# Patient Record
Sex: FEMALE | Race: BLACK OR AFRICAN AMERICAN | NOT HISPANIC OR LATINO | Employment: FULL TIME | URBAN - METROPOLITAN AREA
[De-identification: names, ages, dates, MRNs, and addresses within clinical notes are randomized per-mention and may not be internally consistent; named-entity substitution may affect disease eponyms.]

---

## 2019-12-22 ENCOUNTER — HOSPITAL ENCOUNTER (EMERGENCY)
Facility: HOSPITAL | Age: 24
Discharge: HOME | End: 2019-12-22
Attending: EMERGENCY MEDICINE
Payer: COMMERCIAL

## 2019-12-22 VITALS
RESPIRATION RATE: 20 BRPM | DIASTOLIC BLOOD PRESSURE: 57 MMHG | TEMPERATURE: 99 F | HEART RATE: 69 BPM | OXYGEN SATURATION: 100 % | SYSTOLIC BLOOD PRESSURE: 128 MMHG | BODY MASS INDEX: 22.16 KG/M2 | WEIGHT: 133 LBS | HEIGHT: 65 IN

## 2019-12-22 DIAGNOSIS — N30.01 ACUTE CYSTITIS WITH HEMATURIA: Primary | ICD-10-CM

## 2019-12-22 LAB
B-HCG UR QL: NEGATIVE
BILIRUBIN, POC UA: NEGATIVE
BLOOD, POC UA: ABNORMAL
CLARITY, POC UA: ABNORMAL
COLOR, POC UA: ABNORMAL
CTP QC/QA: YES
GLUCOSE, POC UA: NEGATIVE
KETONES, POC UA: NEGATIVE
LEUKOCYTE EST, POC UA: ABNORMAL
NITRITE, POC UA: POSITIVE
PH UR STRIP: 6 [PH]
PROTEIN, POC UA: ABNORMAL
SPECIFIC GRAVITY, POC UA: >=1.03
UROBILINOGEN, POC UA: 0.2 E.U./DL

## 2019-12-22 PROCEDURE — 81025 URINE PREGNANCY TEST: CPT | Mod: ER | Performed by: EMERGENCY MEDICINE

## 2019-12-22 PROCEDURE — 87077 CULTURE AEROBIC IDENTIFY: CPT

## 2019-12-22 PROCEDURE — 87088 URINE BACTERIA CULTURE: CPT

## 2019-12-22 PROCEDURE — 99284 EMERGENCY DEPT VISIT MOD MDM: CPT | Mod: 25,ER

## 2019-12-22 PROCEDURE — 63600175 PHARM REV CODE 636 W HCPCS: Mod: ER | Performed by: EMERGENCY MEDICINE

## 2019-12-22 PROCEDURE — 96372 THER/PROPH/DIAG INJ SC/IM: CPT | Mod: ER

## 2019-12-22 PROCEDURE — 87186 SC STD MICRODIL/AGAR DIL: CPT

## 2019-12-22 PROCEDURE — 87086 URINE CULTURE/COLONY COUNT: CPT

## 2019-12-22 PROCEDURE — 81003 URINALYSIS AUTO W/O SCOPE: CPT | Mod: ER

## 2019-12-22 RX ORDER — CEFTRIAXONE 1 G/1
1 INJECTION, POWDER, FOR SOLUTION INTRAMUSCULAR; INTRAVENOUS
Status: COMPLETED | OUTPATIENT
Start: 2019-12-22 | End: 2019-12-22

## 2019-12-22 RX ORDER — NITROFURANTOIN 25; 75 MG/1; MG/1
100 CAPSULE ORAL 2 TIMES DAILY
Qty: 10 CAPSULE | Refills: 0 | Status: SHIPPED | OUTPATIENT
Start: 2019-12-22 | End: 2019-12-27

## 2019-12-22 RX ORDER — PHENAZOPYRIDINE HYDROCHLORIDE 200 MG/1
200 TABLET, FILM COATED ORAL 3 TIMES DAILY
Qty: 6 TABLET | Refills: 0 | Status: SHIPPED | OUTPATIENT
Start: 2019-12-22 | End: 2020-01-01

## 2019-12-22 RX ADMIN — CEFTRIAXONE SODIUM 1 G: 1 INJECTION, POWDER, FOR SOLUTION INTRAMUSCULAR; INTRAVENOUS at 10:12

## 2019-12-23 NOTE — ED PROVIDER NOTES
Encounter Date: 12/22/2019    SCRIBE #1 NOTE: I, Anjana Sinclair, am scribing for, and in the presence of,  Dr. Downing . I have scribed the following portions of the note - Other sections scribed: HPI, ROS, PE .       History     Chief Complaint   Patient presents with    Hematuria     PT REPORTS HAVING BLOODY URINE FORE 2-3 DAYS, REPORTS ISN'T POSITIVE THAT ITS COMING FROM URINE.     This is a 24 y.o female with MS presents to the ED complaining of acute constant hematuria for 3 days. She reports her urine looks completely red today. She states the first day she noticed the blood, there was clots and was intermittent. She had thought she was having vaginal bleeding, but had used a tampon, and did not see any blood from vagina. She denies any pain currently. She also denies fever, vaginal discharge, dysuria, back pain, or abdominal pain. She endorses increased urinary frequency x 2 weeks.     The history is provided by the patient. No  was used.     Review of patient's allergies indicates:  No Known Allergies  Past Medical History:   Diagnosis Date    Multiple sclerosis      Past Surgical History:   Procedure Laterality Date    KNEE SURGERY       No family history on file.  Social History     Tobacco Use    Smoking status: Never Smoker    Smokeless tobacco: Never Used   Substance Use Topics    Alcohol use: Yes     Comment: SPECIAL OCCASIONA    Drug use: Never     Review of Systems   Constitutional: Negative for fever.   Gastrointestinal: Negative for abdominal pain, nausea and vomiting.   Genitourinary: Positive for frequency and hematuria. Negative for decreased urine volume, difficulty urinating, dysuria, flank pain, genital sores, menstrual problem, pelvic pain, vaginal bleeding and vaginal discharge.   Musculoskeletal: Negative for back pain.   All other systems reviewed and are negative.      Physical Exam     Initial Vitals [12/22/19 2035]   BP Pulse Resp Temp SpO2   118/80 75 20 98.9  °F (37.2 °C) 100 %      MAP       --         Physical Exam    Nursing note and vitals reviewed.  Constitutional: She appears well-developed and well-nourished.   HENT:   Head: Normocephalic and atraumatic.   Eyes: Conjunctivae are normal.   Neck: Normal range of motion and phonation normal. Neck supple.   Cardiovascular: Normal rate, regular rhythm, normal heart sounds and intact distal pulses. Exam reveals no gallop and no friction rub.    No murmur heard.  Pulmonary/Chest: Effort normal and breath sounds normal. No stridor. No respiratory distress. She has no wheezes. She has no rhonchi. She has no rales.   Abdominal: Soft. There is no tenderness. There is no rigidity, no rebound, no guarding and no CVA tenderness.   Musculoskeletal: Normal range of motion.   Neurological: She is alert and oriented to person, place, and time.   Skin: Skin is warm and dry. Capillary refill takes less than 2 seconds. No rash noted.   Psychiatric: She has a normal mood and affect. Her behavior is normal.         ED Course   Procedures  Labs Reviewed   POCT URINALYSIS W/O SCOPE - Abnormal; Notable for the following components:       Result Value    Spec Grav UA >=1.030 (*)     Blood, UA 3+ (*)     Protein, UA 2+ (*)     Nitrite, UA Positive (*)     Leukocytes, UA Trace (*)     All other components within normal limits   CULTURE, URINE   POCT URINE PREGNANCY   POCT URINALYSIS W/O SCOPE          Imaging Results          CT Renal Stone Study ABD Pelvis WO (Final result)  Result time 12/22/19 22:08:00    Final result by Ingrid Ware MD (12/22/19 22:08:00)                 Impression:      No acute intra-abdominal abnormalities identified.  No evidence of stones or obstructive uropathy.      Electronically signed by: Ingrid Ware MD  Date:    12/22/2019  Time:    22:08             Narrative:    EXAMINATION:  CT RENAL STONE STUDY ABD PELVIS WO    CLINICAL HISTORY:  Hematuria;    TECHNIQUE:  Low dose axial images, sagittal and coronal  reformations were obtained from the lung bases to the pubic symphysis.  Contrast was not administered.    COMPARISON:  None    FINDINGS:  The visualized portion of the heart is unremarkable.  The lung bases are clear.    No significant hepatic abnormality seen on this noncontrast exam.  There is no intra-or extrahepatic biliary ductal dilatation.  The gallbladder is unremarkable.  The stomach, pancreas, spleen, and adrenal glands are unremarkable.    Kidneys show no evidence of stones or hydronephrosis. Ureters are difficult to track, however no stones are seen along their expected courses.  Urinary bladder is nondistended.  Uterus is unremarkable.  No significant adnexal abnormalities are seen.    Appendix is visualized and is unremarkable.  The visualized loops of small and large bowel show no evidence of obstruction or inflammation.  Moderate volume retained stool is seen in the colon.  No free air or free fluid.    Aorta tapers normally.    No acute osseous abnormality identified. Subcutaneous soft tissue structures are unremarkable.                                 Medical Decision Making:   Clinical Tests:   Lab Tests: Ordered and Reviewed  Radiological Study: Ordered and Reviewed        Labs Reviewed  Admission on 12/22/2019, Discharged on 12/22/2019   Component Date Value Ref Range Status    POC Preg Test, Ur 12/22/2019 Negative  Negative Final     Acceptable 12/22/2019 Yes   Final    Glucose, UA 12/22/2019 Negative   Final    Bilirubin, UA 12/22/2019 Negative   Final    Ketones, UA 12/22/2019 Negative   Final    Spec Grav UA 12/22/2019 >=1.030*  Final    Blood, UA 12/22/2019 3+*  Final    PH, UA 12/22/2019 6.0   Final    Protein, UA 12/22/2019 2+*  Final    Urobilinogen, UA 12/22/2019 0.2  E.U./dL Final    Nitrite, UA 12/22/2019 Positive*  Final    Leukocytes, UA 12/22/2019 Trace*  Final    Color, UA 12/22/2019 Dark yellow   Final    Clarity, UA 12/22/2019 Cloudy   Final         Imaging Reviewed    Imaging Results          CT Renal Stone Study ABD Pelvis WO (Final result)  Result time 12/22/19 22:08:00    Final result by Ingrid Ware MD (12/22/19 22:08:00)                 Impression:      No acute intra-abdominal abnormalities identified.  No evidence of stones or obstructive uropathy.      Electronically signed by: Ingrid Ware MD  Date:    12/22/2019  Time:    22:08             Narrative:    EXAMINATION:  CT RENAL STONE STUDY ABD PELVIS WO    CLINICAL HISTORY:  Hematuria;    TECHNIQUE:  Low dose axial images, sagittal and coronal reformations were obtained from the lung bases to the pubic symphysis.  Contrast was not administered.    COMPARISON:  None    FINDINGS:  The visualized portion of the heart is unremarkable.  The lung bases are clear.    No significant hepatic abnormality seen on this noncontrast exam.  There is no intra-or extrahepatic biliary ductal dilatation.  The gallbladder is unremarkable.  The stomach, pancreas, spleen, and adrenal glands are unremarkable.    Kidneys show no evidence of stones or hydronephrosis. Ureters are difficult to track, however no stones are seen along their expected courses.  Urinary bladder is nondistended.  Uterus is unremarkable.  No significant adnexal abnormalities are seen.    Appendix is visualized and is unremarkable.  The visualized loops of small and large bowel show no evidence of obstruction or inflammation.  Moderate volume retained stool is seen in the colon.  No free air or free fluid.    Aorta tapers normally.    No acute osseous abnormality identified. Subcutaneous soft tissue structures are unremarkable.                                Medications given in ED    Medications   cefTRIAXone injection 1 g (1 g Intramuscular Given 12/22/19 9368)       This document was produced by a scribe under my direction and in my presence. I agree with the content of the note and have made any necessary edits.     Joby Downing MD          Note was created using voice recognition software. Note may have occasional typographical errors that may not have been identified and edited despite good parminder initial review prior to signing.         Scribe Attestation:   Scribe #1: I performed the above scribed service and the documentation accurately describes the services I performed. I attest to the accuracy of the note.               Discharge Medications     Discharge Medication List as of 12/22/2019 10:14 PM      START taking these medications    Details   nitrofurantoin, macrocrystal-monohydrate, (MACROBID) 100 MG capsule Take 1 capsule (100 mg total) by mouth 2 (two) times daily. for 5 days, Starting Sun 12/22/2019, Until Fri 12/27/2019, Print      phenazopyridine (PYRIDIUM) 200 MG tablet Take 1 tablet (200 mg total) by mouth 3 (three) times daily. for 10 days, Starting Sun 12/22/2019, Until Wed 1/1/2020, Print                   Patient discharged to home in stable condition with instructions to:   1. Please take all meds as prescribed.  2. Follow-up with your primary care doctor   3. Return precautions discussed and patient and/or family/caretaker understands to return to the emergency room for any concerns including worsening of your current symptoms, fever, chills, night sweats, worsening pain, chest pain, shortness of breath, nausea, vomiting, diarrhea, bleeding, headache, difficulty talking, visual disturbances, weakness, numbness or any other acute concerns         Clinical Impression:     1. Acute cystitis with hematuria            Disposition:   Disposition: Discharged  Condition: Stable                     Joby Downing MD  12/23/19 0113

## 2019-12-24 LAB — BACTERIA UR CULT: ABNORMAL

## 2020-07-30 ENCOUNTER — HOSPITAL ENCOUNTER (EMERGENCY)
Facility: HOSPITAL | Age: 25
Discharge: HOME | End: 2020-07-30
Attending: EMERGENCY MEDICINE
Payer: COMMERCIAL

## 2020-07-30 VITALS
WEIGHT: 136 LBS | OXYGEN SATURATION: 97 % | BODY MASS INDEX: 22.66 KG/M2 | TEMPERATURE: 99 F | HEIGHT: 65 IN | SYSTOLIC BLOOD PRESSURE: 116 MMHG | HEART RATE: 71 BPM | RESPIRATION RATE: 18 BRPM | DIASTOLIC BLOOD PRESSURE: 61 MMHG

## 2020-07-30 DIAGNOSIS — Z20.822 SUSPECTED COVID-19 VIRUS INFECTION: ICD-10-CM

## 2020-07-30 DIAGNOSIS — J02.9 SORE THROAT: Primary | ICD-10-CM

## 2020-07-30 LAB
B-HCG UR QL: NEGATIVE
CTP QC/QA: YES
CTP QC/QA: YES
S PYO RRNA THROAT QL PROBE: NEGATIVE

## 2020-07-30 PROCEDURE — 99283 EMERGENCY DEPT VISIT LOW MDM: CPT | Mod: 25,ER

## 2020-07-30 PROCEDURE — U0003 INFECTIOUS AGENT DETECTION BY NUCLEIC ACID (DNA OR RNA); SEVERE ACUTE RESPIRATORY SYNDROME CORONAVIRUS 2 (SARS-COV-2) (CORONAVIRUS DISEASE [COVID-19]), AMPLIFIED PROBE TECHNIQUE, MAKING USE OF HIGH THROUGHPUT TECHNOLOGIES AS DESCRIBED BY CMS-2020-01-R: HCPCS

## 2020-07-30 PROCEDURE — 81025 URINE PREGNANCY TEST: CPT | Mod: ER | Performed by: EMERGENCY MEDICINE

## 2020-07-30 PROCEDURE — 87081 CULTURE SCREEN ONLY: CPT

## 2020-07-30 PROCEDURE — 87880 STREP A ASSAY W/OPTIC: CPT | Mod: ER

## 2020-07-30 RX ORDER — NAPROXEN 375 MG/1
375 TABLET ORAL 2 TIMES DAILY PRN
Qty: 20 TABLET | Refills: 0 | OUTPATIENT
Start: 2020-07-30 | End: 2020-10-15

## 2020-07-30 NOTE — ED PROVIDER NOTES
Encounter Date: 2020       History     Chief Complaint   Patient presents with    Sore Throat     pt presents to ED with c/o sore throat and cant taste her food. Denies being around anyone who's been sick     loss of taste     25-year-old female who denies past medical history presents with sore throat and loss of taste.  Symptoms started today.  No medications taken for symptoms prior to arrival.  She denies fever, cough, chills, myalgias, nausea, vomiting.  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        Review of patient's allergies indicates:  No Known Allergies  Past Medical History:   Diagnosis Date    Multiple sclerosis      Past Surgical History:   Procedure Laterality Date    KNEE SURGERY       No family history on file.  Social History     Tobacco Use    Smoking status: Never Smoker    Smokeless tobacco: Never Used   Substance Use Topics    Alcohol use: Yes     Comment: SPECIAL OCCASIONA    Drug use: Never     Review of Systems   Constitutional: Negative for chills and fever.   HENT: Positive for sore throat. Negative for congestion.    Eyes: Negative for visual disturbance.   Respiratory: Negative for cough and shortness of breath.    Cardiovascular: Negative for chest pain.   Gastrointestinal: Negative for abdominal pain, nausea and vomiting.   Genitourinary: Negative for dysuria and vaginal discharge.   Skin: Negative for rash.   Neurological: Negative for headaches.   Psychiatric/Behavioral: Negative for decreased concentration.       Physical Exam     Initial Vitals [20 1805]   BP Pulse Resp Temp SpO2   116/61 71 18 98.6 °F (37 °C) 97 %      MAP       --         Physical Exam    Nursing note and vitals reviewed.  Constitutional: She appears well-developed and well-nourished. She is not diaphoretic. No distress.   HENT:   Right Ear: Tympanic membrane normal.   Left Ear: Tympanic membrane normal.   Mouth/Throat:  Uvula is midline and oropharynx is clear and moist. No oral lesions. No oropharyngeal exudate or posterior oropharyngeal erythema.   Eyes: Pupils are equal, round, and reactive to light.   Neck: Neck supple.   Cardiovascular: Normal rate and regular rhythm.   Pulmonary/Chest: Breath sounds normal.   Abdominal: Soft. There is no abdominal tenderness.   Musculoskeletal: No edema.   Neurological: She is alert and oriented to person, place, and time.   Skin: Skin is warm and dry.   Psychiatric: She has a normal mood and affect.         ED Course   Procedures  Labs Reviewed   CULTURE, STREP A,  THROAT   SARS-COV-2 (COVID-19) QUALITATIVE PCR    Narrative:     Is this needed for pre-procedure or pre-op testing?->No   POCT URINE PREGNANCY   POCT RAPID STREP A          Imaging Results    None          Medical Decision Making:   Initial Assessment:   25-year-old female presenting with loss of taste, sore throat.  Symptoms started today.  Overall, this patient is very well-appearing.  I do not appreciate any or pharyngeal exudate or erythema.  Will test for strep throat, will send COVID-19 routine screening.  Reviewed with patient she will need to self quarantine at home until her test results are available.                             This dictation has been generated using M-Modal Fluency Direct dictation; some phonetic errors may occur.       Clinical Impression:       ICD-10-CM ICD-9-CM   1. Sore throat  J02.9 462   2. Suspected Covid-19 Virus Infection  R68.89              ED Disposition Condition    Discharge Stable        ED Prescriptions     Medication Sig Dispense Start Date End Date Auth. Provider    naproxen (NAPROSYN) 375 MG tablet Take 1 tablet (375 mg total) by mouth 2 (two) times daily as needed. 20 tablet 7/30/2020  Radha Toledo MD        Follow-up Information     Follow up With Specialties Details Why Contact Info    [facility]  Schedule an appointment as soon as possible for a visit in  2 weeks To establish primary care, To recheck your symptoms [address] [place] [address]  [phone]      [facility] Emergency Department Emergency Medicine  As needed, If symptoms worsen [address]  [facility] [address] [phone]                                     Radha Toledo MD  07/31/20 5833

## 2020-07-30 NOTE — Clinical Note
"Sa Lara "Sa Israel Thapa was seen and treated in our emergency department on 7/30/2020.     COVID-19 is present in our communities across the state. There is limited testing for COVID at this time, so not all patients can be tested. In this situation, your employee meets the following criteria:    Sa Marco Thapa has met the criteria for COVID-19 testing based upon symptoms, travel, and/or potential exposure. The test has been completed and is pending results at this time. During this time the employee is not able to work and should be quarantined per the Centers for Disease Control timelines.     If you have any questions or concerns, or if I can be of further assistance, please do not hesitate to contact me.    Sincerely,             Radha Toledo MD"
"Sa Lara "Sa Israel Thapa was seen and treated in our emergency department on 7/30/2020.     COVID-19 is present in our communities across the state. There is limited testing for COVID at this time, so not all patients can be tested. In this situation, your employee meets the following criteria:    Sa Marco Thapa has met the criteria for COVID-19 testing based upon symptoms, travel, and/or potential exposure. The test has been completed and is pending results at this time. During this time the employee is not able to work and should be quarantined per the Centers for Disease Control timelines.     If you have any questions or concerns, or if I can be of further assistance, please do not hesitate to contact me.    Sincerely,             Radha Toledo MD"
[facility]

## 2020-07-31 LAB — SARS-COV-2 RNA RESP QL NAA+PROBE: NOT DETECTED

## 2020-08-01 LAB — BACTERIA THROAT CULT: NORMAL

## 2020-09-08 ENCOUNTER — HOSPITAL ENCOUNTER (EMERGENCY)
Facility: HOSPITAL | Age: 25
Discharge: HOME | End: 2020-09-08
Attending: EMERGENCY MEDICINE
Payer: COMMERCIAL

## 2020-09-08 VITALS
HEIGHT: 65 IN | RESPIRATION RATE: 18 BRPM | WEIGHT: 137 LBS | DIASTOLIC BLOOD PRESSURE: 57 MMHG | BODY MASS INDEX: 22.82 KG/M2 | OXYGEN SATURATION: 98 % | HEART RATE: 62 BPM | TEMPERATURE: 99 F | SYSTOLIC BLOOD PRESSURE: 122 MMHG

## 2020-09-08 DIAGNOSIS — S33.5XXA LUMBAR SPRAIN, INITIAL ENCOUNTER: ICD-10-CM
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
## 2020-09-08 DIAGNOSIS — S13.9XXA CERVICAL SPRAIN, INITIAL ENCOUNTER: ICD-10-CM

## 2020-09-08 LAB
B-HCG UR QL: NEGATIVE
CTP QC/QA: YES

## 2020-09-08 PROCEDURE — 99284 EMERGENCY DEPT VISIT MOD MDM: CPT | Mod: 25,ER

## 2020-09-08 PROCEDURE — 81025 URINE PREGNANCY TEST: CPT | Mod: ER | Performed by: EMERGENCY MEDICINE

## 2020-09-08 PROCEDURE — 25000003 PHARM REV CODE 250: Mod: ER | Performed by: NURSE PRACTITIONER

## 2020-09-08 RX ORDER — IBUPROFEN 600 MG/1
600 TABLET ORAL ONCE
Status: COMPLETED | OUTPATIENT
Start: 2020-09-08 | End: 2020-09-08

## 2020-09-08 RX ORDER — METHOCARBAMOL 500 MG/1
1000 TABLET, FILM COATED ORAL EVERY 6 HOURS PRN
Qty: 40 TABLET | Refills: 0 | OUTPATIENT
Start: 2020-09-08 | End: 2020-10-15

## 2020-09-08 RX ORDER — TRIPROLIDINE/PSEUDOEPHEDRINE 2.5MG-60MG
600 TABLET ORAL
Status: DISCONTINUED | OUTPATIENT
Start: 2020-09-08 | End: 2020-09-08

## 2020-09-08 RX ORDER — DICLOFENAC SODIUM 50 MG/1
50 TABLET, DELAYED RELEASE ORAL 3 TIMES DAILY PRN
Qty: 24 TABLET | Refills: 0 | OUTPATIENT
Start: 2020-09-08 | End: 2020-10-15

## 2020-09-08 RX ADMIN — IBUPROFEN 600 MG: 600 TABLET ORAL at 07:09

## 2020-09-08 NOTE — Clinical Note
Sa Marco Thapa was seen and treated in our emergency department on 9/8/2020.  She may return to work on 09/11/2020.       If you have any questions or concerns, please don't hesitate to call.      Mc Barton RN
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
## 2020-09-08 NOTE — Clinical Note
Sa Marco Thapa was seen and treated in our emergency department on 9/8/2020.  She may return to work on 09/14/2020.       If you have any questions or concerns, please don't hesitate to call.      Mc Barton RN

## 2020-10-15 ENCOUNTER — HOSPITAL ENCOUNTER (EMERGENCY)
Facility: HOSPITAL | Age: 25
Discharge: HOME | End: 2020-10-15
Attending: INTERNAL MEDICINE
Payer: COMMERCIAL

## 2020-10-15 VITALS
TEMPERATURE: 98 F | HEIGHT: 65 IN | RESPIRATION RATE: 18 BRPM | OXYGEN SATURATION: 100 % | HEART RATE: 63 BPM | SYSTOLIC BLOOD PRESSURE: 110 MMHG | DIASTOLIC BLOOD PRESSURE: 63 MMHG | WEIGHT: 137 LBS | BODY MASS INDEX: 22.82 KG/M2

## 2020-10-15 DIAGNOSIS — Z04.1 EXAM FOLLOWING MVC (MOTOR VEHICLE COLLISION), NO APPARENT INJURY: Primary | ICD-10-CM

## 2020-10-15 LAB
B-HCG UR QL: NEGATIVE
CTP QC/QA: YES

## 2020-10-15 PROCEDURE — 99284 EMERGENCY DEPT VISIT MOD MDM: CPT | Mod: ER

## 2020-10-15 PROCEDURE — 81025 URINE PREGNANCY TEST: CPT | Mod: ER | Performed by: EMERGENCY MEDICINE

## 2020-10-15 RX ORDER — IBUPROFEN 600 MG/1
600 TABLET ORAL 3 TIMES DAILY
Qty: 30 TABLET | Refills: 0 | OUTPATIENT
Start: 2020-10-15 | End: 2021-12-28

## 2020-10-15 RX ORDER — METHOCARBAMOL 750 MG/1
1500 TABLET, FILM COATED ORAL 3 TIMES DAILY
Qty: 30 TABLET | Refills: 0 | Status: SHIPPED | OUTPATIENT
Start: 2020-10-15 | End: 2020-10-20

## 2020-10-15 NOTE — Clinical Note
"Sa Lara "Sa Lara" Elier was seen and treated in our emergency department on 10/15/2020.  She may return to work on 10/16/2020.       If you have any questions or concerns, please don't hesitate to call.      Leila Santana RN    "
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
## 2021-07-15 ENCOUNTER — HOSPITAL ENCOUNTER (EMERGENCY)
Facility: HOSPITAL | Age: 26
Discharge: HOME | End: 2021-07-15
Attending: EMERGENCY MEDICINE
Payer: MEDICAID

## 2021-07-15 VITALS
TEMPERATURE: 99 F | OXYGEN SATURATION: 99 % | BODY MASS INDEX: 22.99 KG/M2 | RESPIRATION RATE: 16 BRPM | HEART RATE: 79 BPM | SYSTOLIC BLOOD PRESSURE: 99 MMHG | DIASTOLIC BLOOD PRESSURE: 78 MMHG | WEIGHT: 138 LBS | HEIGHT: 65 IN

## 2021-07-15 DIAGNOSIS — R10.9 ABDOMINAL PAIN: ICD-10-CM

## 2021-07-15 DIAGNOSIS — K59.00 CONSTIPATION, UNSPECIFIED CONSTIPATION TYPE: Primary | ICD-10-CM

## 2021-07-15 DIAGNOSIS — R51.9 HEADACHE IN FRONT OF HEAD: ICD-10-CM

## 2021-07-15 LAB
ALBUMIN SERPL-MCNC: 3.6 G/DL (ref 3.3–5.5)
ALP SERPL-CCNC: 75 U/L (ref 42–141)
B-HCG UR QL: NEGATIVE
BILIRUB SERPL-MCNC: 0.7 MG/DL (ref 0.2–1.6)
BUN SERPL-MCNC: 12 MG/DL (ref 7–22)
CALCIUM SERPL-MCNC: 9.5 MG/DL (ref 8–10.3)
CHLORIDE SERPL-SCNC: 107 MMOL/L (ref 98–108)
CREAT SERPL-MCNC: 0.9 MG/DL (ref 0.6–1.2)
CTP QC/QA: YES
GLUCOSE SERPL-MCNC: 89 MG/DL (ref 73–118)
POC ALT (SGPT): 16 U/L (ref 10–47)
POC AST (SGOT): 27 U/L (ref 11–38)
POC TCO2: 27 MMOL/L (ref 18–33)
POTASSIUM BLD-SCNC: 3.9 MMOL/L (ref 3.6–5.1)
PROTEIN, POC: 7.2 G/DL (ref 6.4–8.1)
SODIUM BLD-SCNC: 139 MMOL/L (ref 128–145)

## 2021-07-15 PROCEDURE — 81025 URINE PREGNANCY TEST: CPT | Mod: ER | Performed by: EMERGENCY MEDICINE

## 2021-07-15 PROCEDURE — 96360 HYDRATION IV INFUSION INIT: CPT | Mod: ER

## 2021-07-15 PROCEDURE — 85025 COMPLETE CBC W/AUTO DIFF WBC: CPT | Mod: ER

## 2021-07-15 PROCEDURE — 80053 COMPREHEN METABOLIC PANEL: CPT | Mod: ER

## 2021-07-15 PROCEDURE — 25000003 PHARM REV CODE 250: Mod: ER | Performed by: EMERGENCY MEDICINE

## 2021-07-15 PROCEDURE — 99285 EMERGENCY DEPT VISIT HI MDM: CPT | Mod: 25,ER

## 2021-07-15 RX ORDER — DOCUSATE SODIUM 100 MG/1
100 CAPSULE, LIQUID FILLED ORAL 2 TIMES DAILY
Qty: 60 CAPSULE | Refills: 0 | Status: SHIPPED | OUTPATIENT
Start: 2021-07-15 | End: 2022-03-10

## 2021-07-15 RX ORDER — PROCHLORPERAZINE MALEATE 10 MG
10 TABLET ORAL EVERY 6 HOURS PRN
Qty: 30 TABLET | Refills: 0 | Status: SHIPPED | OUTPATIENT
Start: 2021-07-15

## 2021-07-15 RX ORDER — PREDNISONE 50 MG/1
50 TABLET ORAL DAILY
Qty: 5 TABLET | Refills: 0 | Status: SHIPPED | OUTPATIENT
Start: 2021-07-15 | End: 2021-07-20

## 2021-07-15 RX ORDER — BUTALBITAL, ACETAMINOPHEN AND CAFFEINE 50; 325; 40 MG/1; MG/1; MG/1
1 TABLET ORAL EVERY 4 HOURS PRN
Qty: 15 TABLET | Refills: 0 | Status: SHIPPED | OUTPATIENT
Start: 2021-07-15 | End: 2021-08-14

## 2021-07-15 RX ADMIN — SODIUM CHLORIDE 1000 ML: 0.9 INJECTION, SOLUTION INTRAVENOUS at 08:07

## 2021-12-28 ENCOUNTER — HOSPITAL ENCOUNTER (EMERGENCY)
Facility: HOSPITAL | Age: 26
Discharge: HOME | End: 2021-12-28
Attending: EMERGENCY MEDICINE
Payer: MEDICAID

## 2021-12-28 VITALS
HEART RATE: 58 BPM | RESPIRATION RATE: 18 BRPM | OXYGEN SATURATION: 100 % | SYSTOLIC BLOOD PRESSURE: 119 MMHG | TEMPERATURE: 99 F | HEIGHT: 65 IN | DIASTOLIC BLOOD PRESSURE: 83 MMHG | WEIGHT: 122 LBS | BODY MASS INDEX: 20.33 KG/M2

## 2021-12-28 DIAGNOSIS — S16.1XXA CERVICAL STRAIN, ACUTE, INITIAL ENCOUNTER: ICD-10-CM

## 2021-12-28 DIAGNOSIS — S00.83XA CONTUSION OF FACE, INITIAL ENCOUNTER: Primary | ICD-10-CM

## 2021-12-28 LAB
B-HCG UR QL: NEGATIVE
CTP QC/QA: YES

## 2021-12-28 PROCEDURE — 99284 EMERGENCY DEPT VISIT MOD MDM: CPT | Mod: 25,ER

## 2021-12-28 PROCEDURE — 25000003 PHARM REV CODE 250: Mod: ER | Performed by: NURSE PRACTITIONER

## 2021-12-28 PROCEDURE — 81025 URINE PREGNANCY TEST: CPT | Mod: ER | Performed by: EMERGENCY MEDICINE

## 2021-12-28 PROCEDURE — 96372 THER/PROPH/DIAG INJ SC/IM: CPT | Mod: ER

## 2021-12-28 PROCEDURE — 63600175 PHARM REV CODE 636 W HCPCS: Mod: ER | Performed by: EMERGENCY MEDICINE

## 2021-12-28 RX ORDER — METHOCARBAMOL 750 MG/1
1500 TABLET, FILM COATED ORAL EVERY 6 HOURS
Qty: 24 TABLET | Refills: 0 | Status: SHIPPED | OUTPATIENT
Start: 2021-12-28 | End: 2021-12-31

## 2021-12-28 RX ORDER — KETOROLAC TROMETHAMINE 30 MG/ML
30 INJECTION, SOLUTION INTRAMUSCULAR; INTRAVENOUS
Status: COMPLETED | OUTPATIENT
Start: 2021-12-28 | End: 2021-12-28

## 2021-12-28 RX ORDER — IBUPROFEN 800 MG/1
800 TABLET ORAL EVERY 6 HOURS PRN
Qty: 20 TABLET | Refills: 0 | Status: SHIPPED | OUTPATIENT
Start: 2021-12-28 | End: 2022-03-10

## 2021-12-28 RX ORDER — ACETAMINOPHEN 325 MG/1
650 TABLET ORAL
Status: COMPLETED | OUTPATIENT
Start: 2021-12-28 | End: 2021-12-28

## 2021-12-28 RX ADMIN — KETOROLAC TROMETHAMINE 30 MG: 30 INJECTION, SOLUTION INTRAMUSCULAR at 07:12

## 2021-12-28 RX ADMIN — ACETAMINOPHEN 650 MG: 325 TABLET ORAL at 06:12
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
## 2021-12-28 NOTE — Clinical Note
"Sa Lara "Sa Lara" Elier was seen and treated in our emergency department on 12/28/2021.  She may return to work on 12/30/2021.       If you have any questions or concerns, please don't hesitate to call.      Irish Fernandez RN    "
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
## 2022-03-10 ENCOUNTER — HOSPITAL ENCOUNTER (EMERGENCY)
Facility: HOSPITAL | Age: 27
Discharge: HOME | End: 2022-03-10
Attending: EMERGENCY MEDICINE
Payer: MEDICAID

## 2022-03-10 VITALS
BODY MASS INDEX: 23.49 KG/M2 | HEART RATE: 60 BPM | SYSTOLIC BLOOD PRESSURE: 112 MMHG | HEIGHT: 65 IN | TEMPERATURE: 99 F | WEIGHT: 141 LBS | RESPIRATION RATE: 18 BRPM | OXYGEN SATURATION: 100 % | DIASTOLIC BLOOD PRESSURE: 67 MMHG

## 2022-03-10 DIAGNOSIS — N92.0 MENORRHAGIA WITH REGULAR CYCLE: Primary | ICD-10-CM

## 2022-03-10 LAB
B-HCG UR QL: NEGATIVE
BILIRUBIN, POC UA: NEGATIVE
BLOOD, POC UA: ABNORMAL
CLARITY, POC UA: CLEAR
COLOR, POC UA: YELLOW
CTP QC/QA: YES
GLUCOSE, POC UA: NEGATIVE
KETONES, POC UA: NEGATIVE
LEUKOCYTE EST, POC UA: NEGATIVE
NITRITE, POC UA: NEGATIVE
PH UR STRIP: 7 [PH]
PROTEIN, POC UA: ABNORMAL
SPECIFIC GRAVITY, POC UA: 1.02
UROBILINOGEN, POC UA: 2 E.U./DL

## 2022-03-10 PROCEDURE — 99284 EMERGENCY DEPT VISIT MOD MDM: CPT | Mod: 25,ER

## 2022-03-10 PROCEDURE — 81025 URINE PREGNANCY TEST: CPT | Mod: ER | Performed by: EMERGENCY MEDICINE

## 2022-03-10 PROCEDURE — 87481 CANDIDA DNA AMP PROBE: CPT | Mod: 59 | Performed by: EMERGENCY MEDICINE

## 2022-03-10 PROCEDURE — 96372 THER/PROPH/DIAG INJ SC/IM: CPT | Performed by: EMERGENCY MEDICINE

## 2022-03-10 PROCEDURE — 87491 CHLMYD TRACH DNA AMP PROBE: CPT | Performed by: EMERGENCY MEDICINE

## 2022-03-10 PROCEDURE — 25000003 PHARM REV CODE 250: Mod: ER | Performed by: EMERGENCY MEDICINE

## 2022-03-10 PROCEDURE — 87801 DETECT AGNT MULT DNA AMPLI: CPT | Performed by: EMERGENCY MEDICINE

## 2022-03-10 PROCEDURE — 81003 URINALYSIS AUTO W/O SCOPE: CPT | Mod: ER

## 2022-03-10 PROCEDURE — 63600175 PHARM REV CODE 636 W HCPCS: Mod: ER | Performed by: EMERGENCY MEDICINE

## 2022-03-10 PROCEDURE — 87591 N.GONORRHOEAE DNA AMP PROB: CPT | Mod: 59 | Performed by: EMERGENCY MEDICINE

## 2022-03-10 RX ORDER — ONDANSETRON 4 MG/1
4 TABLET, ORALLY DISINTEGRATING ORAL ONCE
Status: COMPLETED | OUTPATIENT
Start: 2022-03-10 | End: 2022-03-10

## 2022-03-10 RX ORDER — NAPROXEN 500 MG/1
500 TABLET ORAL 2 TIMES DAILY WITH MEALS
Qty: 30 TABLET | Refills: 0 | Status: SHIPPED | OUTPATIENT
Start: 2022-03-10

## 2022-03-10 RX ORDER — ONDANSETRON 4 MG/1
4 TABLET, FILM COATED ORAL EVERY 6 HOURS
Qty: 12 TABLET | Refills: 1 | Status: SHIPPED | OUTPATIENT
Start: 2022-03-10

## 2022-03-10 RX ORDER — KETOROLAC TROMETHAMINE 30 MG/ML
15 INJECTION, SOLUTION INTRAMUSCULAR; INTRAVENOUS
Status: COMPLETED | OUTPATIENT
Start: 2022-03-10 | End: 2022-03-10

## 2022-03-10 RX ADMIN — ONDANSETRON 4 MG: 4 TABLET, ORALLY DISINTEGRATING ORAL at 04:03

## 2022-03-10 RX ADMIN — KETOROLAC TROMETHAMINE 15 MG: 30 INJECTION, SOLUTION INTRAMUSCULAR; INTRAVENOUS at 04:03

## 2022-03-10 NOTE — Clinical Note
"Sa Marco Lara" Elier was seen and treated in our emergency department on 3/10/2022.  She may return to work on 03/11/2022.       If you have any questions or concerns, please don't hesitate to call.       MD    "

## 2022-03-10 NOTE — Clinical Note
"Sa Marco Lara" Elier was seen and treated in our emergency department on 3/10/2022.  She may return to school on 03/11/2022.      If you have any questions or concerns, please don't hesitate to call.       MD"

## 2022-03-10 NOTE — FIRST PROVIDER EVALUATION
Medical screening exam completed.  I have conducted a focused provider triage encounter, findings are as follows:    26-year-old female with 2 years of abdominal pain localized in the bilateral right and left lower lower quadrant.  Pain is baseline, but worse with menstrual cycle and worse after sexual intercourse.  Patient has regular periods but they are very heavy, using several pads or tampons a day associated with clotting.  Patient takes ibuprofen with some relief but not complete relief.  She is not on birth control pills.  No vaginal discharge no vomiting no fever.  She is currently on her menstrual cycle the pain seems worse than usual.  No dysuria    Brief history of present illness:  As above    Vitals:    03/10/22 1254   BP: 109/67   BP Location: Right arm   Patient Position: Sitting   Pulse: 70   Resp: 18   Temp: 98.2 °F (36.8 °C)   TempSrc: Oral   SpO2: 99%   Weight: 64 kg (141 lb)       Pertinent physical exam:  No distress. abd soft no rebound    Brief workup plan:  UPT, UA, pelvic exam on pt being roomed    Preliminary workup initiated; this workup will be continued and followed by the physician or advanced practice provider that is assigned to the patient when roomed.

## 2022-03-10 NOTE — Clinical Note
"Sa Marco Lara" Elier was seen and treated in our emergency department on 3/10/2022.  She may return to school on 03/11/2022.      If you have any questions or concerns, please don't hesitate to call.       RN"

## 2022-03-10 NOTE — Clinical Note
"Sa Lara "Sa Lara"Elier was seen and treated in our emergency department on 3/10/2022.  She may return to work on 03/15/2022.       If you have any questions or concerns, please don't hesitate to call.      Brandie Potter MD"

## 2022-03-10 NOTE — Clinical Note
"Sa Marco Lara" Elier was seen and treated in our emergency department on 3/10/2022.  She may return to work on 03/11/2022.       If you have any questions or concerns, please don't hesitate to call.       RN    "

## 2022-03-14 LAB
BACTERIAL VAGINOSIS DNA: NEGATIVE
CANDIDA GLABRATA DNA: NEGATIVE
CANDIDA KRUSEI DNA: NEGATIVE
CANDIDA RRNA VAG QL PROBE: NEGATIVE
T VAGINALIS RRNA GENITAL QL PROBE: NEGATIVE

## 2022-03-15 LAB
C TRACH DNA SPEC QL NAA+PROBE: NOT DETECTED
N GONORRHOEA DNA SPEC QL NAA+PROBE: NOT DETECTED

## 2023-07-28 ENCOUNTER — PATIENT MESSAGE (OUTPATIENT)
Dept: DERMATOLOGY | Facility: CLINIC | Age: 28
End: 2023-07-28
Payer: MEDICAID

## 2023-07-31 ENCOUNTER — TELEPHONE (OUTPATIENT)
Dept: DERMATOLOGY | Facility: CLINIC | Age: 28
End: 2023-07-31
Payer: MEDICAID

## 2023-07-31 ENCOUNTER — OFFICE VISIT (OUTPATIENT)
Dept: DERMATOLOGY | Facility: CLINIC | Age: 28
End: 2023-07-31
Payer: MEDICAID

## 2023-07-31 DIAGNOSIS — L21.9 SEBORRHEIC DERMATITIS: Primary | ICD-10-CM

## 2023-07-31 DIAGNOSIS — Z76.89 ENCOUNTER FOR SKIN CARE: ICD-10-CM

## 2023-07-31 DIAGNOSIS — L30.9 ECZEMA, UNSPECIFIED TYPE: ICD-10-CM

## 2023-07-31 PROCEDURE — 99999 PR PBB SHADOW E&M-EST. PATIENT-LVL II: ICD-10-PCS | Mod: PBBFAC,,, | Performed by: DERMATOLOGY

## 2023-07-31 PROCEDURE — 1159F PR MEDICATION LIST DOCUMENTED IN MEDICAL RECORD: ICD-10-PCS | Mod: CPTII,,, | Performed by: DERMATOLOGY

## 2023-07-31 PROCEDURE — 99212 OFFICE O/P EST SF 10 MIN: CPT | Mod: PBBFAC,PN | Performed by: DERMATOLOGY

## 2023-07-31 PROCEDURE — 1160F PR REVIEW ALL MEDS BY PRESCRIBER/CLIN PHARMACIST DOCUMENTED: ICD-10-PCS | Mod: CPTII,,, | Performed by: DERMATOLOGY

## 2023-07-31 PROCEDURE — 99204 OFFICE O/P NEW MOD 45 MIN: CPT | Mod: S$PBB,,, | Performed by: DERMATOLOGY

## 2023-07-31 PROCEDURE — 99204 PR OFFICE/OUTPT VISIT, NEW, LEVL IV, 45-59 MIN: ICD-10-PCS | Mod: S$PBB,,, | Performed by: DERMATOLOGY

## 2023-07-31 PROCEDURE — 99999 PR PBB SHADOW E&M-EST. PATIENT-LVL II: CPT | Mod: PBBFAC,,, | Performed by: DERMATOLOGY

## 2023-07-31 PROCEDURE — 1160F RVW MEDS BY RX/DR IN RCRD: CPT | Mod: CPTII,,, | Performed by: DERMATOLOGY

## 2023-07-31 PROCEDURE — 1159F MED LIST DOCD IN RCRD: CPT | Mod: CPTII,,, | Performed by: DERMATOLOGY

## 2023-07-31 RX ORDER — KETOCONAZOLE 20 MG/G
CREAM TOPICAL 2 TIMES DAILY
Qty: 60 G | Refills: 4 | Status: SHIPPED | OUTPATIENT
Start: 2023-07-31 | End: 2023-08-11 | Stop reason: SDUPTHER

## 2023-07-31 RX ORDER — CLOBETASOL PROPIONATE 0.5 MG/G
CREAM TOPICAL 2 TIMES DAILY
Qty: 45 G | Refills: 1 | Status: SHIPPED | OUTPATIENT
Start: 2023-07-31 | End: 2023-08-11 | Stop reason: SDUPTHER

## 2023-07-31 NOTE — TELEPHONE ENCOUNTER
----- Message from April PARRIS Rebollar sent at 7/28/2023  4:54 PM CDT -----    ----- Message -----  From: Belinda Smith  Sent: 7/28/2023  11:01 AM CDT  To: [facility]    Type: Appointment Request     Name of Caller: SA MATTHEW MORALEZ [[id number]]  When is the first available appointment? Do not have access  Reason for Visit:  dry skin over body flaring up on face   Best Call Back Number: [phone]  Additional Information:

## 2023-07-31 NOTE — PROGRESS NOTES
Subjective:      Patient ID:  Sa Marco Thapa is a 28 y.o. female who presents for   Chief Complaint   Patient presents with    Dry Skin     Dry Skin - Initial  Affected locations: scalp, face, left hand, right hand, left foot and right foot  Duration: 1 month  Signs / symptoms: dryness  Severity: mild to moderate  Timing: constant      Review of Systems   Constitutional: Negative.    HENT: Negative.     Respiratory: Negative.     Musculoskeletal: Negative.    Skin:  Positive for dry skin.   Psychiatric/Behavioral:  Positive for high stress.        Objective:   Physical Exam   Constitutional: She appears well-developed and well-nourished.   Neurological: She is alert.   Psychiatric: She has a normal mood and affect.   Skin:                   Diagram Legend     Erythematous scaling macule/papule c/w actinic keratosis       Vascular papule c/w angioma      Pigmented verrucoid papule/plaque c/w seborrheic keratosis      Yellow umbilicated papule c/w sebaceous hyperplasia      Irregularly shaped tan macule c/w lentigo     1-2 mm smooth white papules consistent with Milia      Movable subcutaneous cyst with punctum c/w epidermal inclusion cyst      Subcutaneous movable cyst c/w pilar cyst      Firm pink to brown papule c/w dermatofibroma      Pedunculated fleshy papule(s) c/w skin tag(s)      Evenly pigmented macule c/w junctional nevus     Mildly variegated pigmented, slightly irregular-bordered macule c/w mildly atypical nevus      Flesh colored to evenly pigmented papule c/w intradermal nevus       Pink pearly papule/plaque c/w basal cell carcinoma      Erythematous hyperkeratotic cursted plaque c/w SCC      Surgical scar with no sign of skin cancer recurrence      Open and closed comedones      Inflammatory papules and pustules      Verrucoid papule consistent consistent with wart     Erythematous eczematous patches and plaques     Dystrophic onycholytic nail with subungual debris c/w onychomycosis      Umbilicated papule    Erythematous-base heme-crusted tan verrucoid plaque consistent with inflamed seborrheic keratosis     Erythematous Silvery Scaling Plaque c/w Psoriasis     See annotation    Hands with dry patches.  Assessment / Plan:        Seborrheic dermatitis  -     ketoconazole (NIZORAL) 2 % cream; Apply topically 2 (two) times daily. Prn rash ears, hairline, neck.  Dispense: 60 g; Refill: 4  Discussed with patient the etiology and pathogenesis of the disease or skin lesion(s) and possible treatments and aggravators.    Reviewed with patient different treatment options and associated risks.  Brochure given for patient education.  Previous [facility] labs and or records and notes reviewed and considered for their impact on our clinical decision making today.  Patient to start 2-5% crude coal tar shampoo to be used as scalp soaks for at least 10 minutes, longer if possible, per their regular shampooing schedule.  Can also be applied as directed to other parts of the body.  Be careful around eyes.  Proper application of medications and or care for affected area(s) and condition(s) reviewed.  Chronic nature of this condition discussed with patient.    Eczema, unspecified type  -     clobetasoL (TEMOVATE) 0.05 % cream; Apply topically 2 (two) times daily. Prn rash feet and hands.Stop using steroid topical when skin is smooth and non itchy.  Do not treat dark or red coloring.  Dispense: 60 g; Refill: 1  Hands and feet.  Brochure given for patient education.  Discussed with patient the etiology and pathogenesis of the disease or skin lesion(s) and possible treatments and aggravators.    Reviewed with patient different treatment options and associated risks.  Proper application of medications and or care for affected area(s) and condition(s) reviewed.  Discussed with patient to use organic coconut oil or pure shea butter at least daily for moisturization for the body and organic jojoba oil at least daily for the  face.  Sandals or slippers at home as not to slide around and risk fall on non carpeted floors if applied to the soles.  Watch for psor.  Patient to start 2-5% crude coal tar shampoo to be used as scalp soaks for at least 10 minutes, longer if possible, per their regular shampooing schedule.  Can also be applied as directed to other parts of the body.  Be careful around eyes.    Encounter for skin care  Reviewed with patient to air out feet as much as possible, change socks at work after wiping feet down and applying corn starch during breaks.  Recommended more hand  than water washing for routine germ prevention.  Good skin care regimen discussed including limiting to one bath or shower per day, using lukewarm water with mild soap and moisturization to skin once to twice daily.  Consider glycerin bar soap or Dove.  Consider organic coconut oil.             Follow up in about 2 months (around 9/30/2023).

## 2023-07-31 NOTE — PATIENT INSTRUCTIONS
Avoid hot water     Recommended more hand  than water washing for routine germ prevention.    Moisturize with coconut oil     Patient to start 2-5% crude coal tar shampoo to be used as scalp soaks for at least 10 minutes, longer if possible, per their regular shampooing schedule.  Can also be applied as directed to other parts of the body.  Be careful around eyes.    Ketoconazole cream for face

## 2023-08-11 DIAGNOSIS — L30.9 ECZEMA, UNSPECIFIED TYPE: ICD-10-CM

## 2023-08-11 DIAGNOSIS — L21.9 SEBORRHEIC DERMATITIS: ICD-10-CM

## 2023-08-11 RX ORDER — CLOBETASOL PROPIONATE 0.5 MG/G
CREAM TOPICAL 2 TIMES DAILY
Qty: 45 G | Refills: 1 | Status: SHIPPED | OUTPATIENT
Start: 2023-08-11

## 2023-08-11 RX ORDER — KETOCONAZOLE 20 MG/G
CREAM TOPICAL 2 TIMES DAILY
Qty: 60 G | Refills: 4 | Status: SHIPPED | OUTPATIENT
Start: 2023-08-11